# Patient Record
Sex: MALE | NOT HISPANIC OR LATINO | URBAN - METROPOLITAN AREA
[De-identification: names, ages, dates, MRNs, and addresses within clinical notes are randomized per-mention and may not be internally consistent; named-entity substitution may affect disease eponyms.]

---

## 2020-12-03 ENCOUNTER — IMPORTED ENCOUNTER (OUTPATIENT)
Dept: URBAN - METROPOLITAN AREA CLINIC 31 | Facility: CLINIC | Age: 85
End: 2020-12-03

## 2020-12-03 PROBLEM — H25.13: Noted: 2020-12-03

## 2020-12-03 PROCEDURE — 99204 OFFICE O/P NEW MOD 45 MIN: CPT

## 2020-12-03 PROCEDURE — 92025 CPTRIZED CORNEAL TOPOGRAPHY: CPT

## 2020-12-03 PROCEDURE — 92136 OPHTHALMIC BIOMETRY: CPT

## 2020-12-03 NOTE — PATIENT DISCUSSION
Discussed the risks benefits alternatives and limitations of cataract surgery including infection bleeding loss of vision retinal tears detachment. The patient stated a full understanding and a desire to proceed with the procedure in both eyes. Refractive options reviewed. Patient understands IOL calcs are more difficult and that intraoperative ORA measurements will increase the success but does not guarantee not needing a refractive enhancement. Pt desired to schedule surgery OU with ORA guidance. OS/ODnear mature cataracts

## 2020-12-10 ENCOUNTER — IMPORTED ENCOUNTER (OUTPATIENT)
Dept: URBAN - METROPOLITAN AREA CLINIC 31 | Facility: CLINIC | Age: 85
End: 2020-12-10

## 2020-12-10 PROBLEM — H25.813: Noted: 2020-12-10

## 2020-12-10 PROCEDURE — 76519 ECHO EXAM OF EYE: CPT

## 2020-12-10 NOTE — PATIENT DISCUSSION
Cataract OU: Discussed the risks benefits alternatives and limitations of cataract surgery including infection bleeding loss of vision retinal tears detachment. The patient stated a full understanding and a desire to proceed with the procedure in both eyes. Refractive options reviewed. Patient understands IOL calcs are more difficult and that intraoperative ORA measurements will increase the success but does not guarantee not needing a refractive enhancement. Pt desired to schedule surgery OU with ORA guidance.

## 2020-12-10 NOTE — PATIENT DISCUSSION
1.  Discussed the risks benefits alternatives and limitations of cataract surgery including infection bleeding loss of vision retinal tears detachment. The patient stated a full understanding and a desire to proceed with the procedure in both eyes. Refractive options were reviewed. Patient has elected to be optimized for distance vision in both eyes. The patient will still need glasses for reading and to possibly fine tune distance vision. 2. Cataract OU: Discussed the risks benefits alternatives and limitations of cataract surgery including infection bleeding loss of vision retinal tears detachment. The patient stated a full understanding and a desire to proceed with the procedure in both eyes. Refractive options reviewed. Patient understands IOL calcs are more difficult and that intraoperative ORA measurements will increase the success but does not guarantee not needing a refractive enhancement. Pt desired to schedule surgery OU with ORA guidance. Dense cataract  post LASIK. ORA recommended. Need for glasses post op disucssed.

## 2020-12-29 ENCOUNTER — IMPORTED ENCOUNTER (OUTPATIENT)
Dept: URBAN - METROPOLITAN AREA CLINIC 31 | Facility: CLINIC | Age: 85
End: 2020-12-29

## 2020-12-29 PROBLEM — Z96.1: Noted: 2020-12-29

## 2020-12-29 PROCEDURE — 99024 POSTOP FOLLOW-UP VISIT: CPT

## 2020-12-29 NOTE — PATIENT DISCUSSION
Post-Op Day #1 - Cataract Surgery Left Eye (OS) - doing well. Tears prn. S/P POST CAPS TEAR W/O VITREOUS LOSS.3 PIECE IOL WITH HAPTICS IN THE SULCUS OPTIC IN THEBAG  Continue postop drops as directed.    Call office with symptoms of pain redness or decreased vision in operative eye.  1 drop of tobramycin instilled

## 2021-01-05 ENCOUNTER — IMPORTED ENCOUNTER (OUTPATIENT)
Dept: URBAN - METROPOLITAN AREA CLINIC 31 | Facility: CLINIC | Age: 86
End: 2021-01-05

## 2021-01-05 PROBLEM — Z96.1: Noted: 2021-01-05

## 2021-01-05 PROCEDURE — 99024 POSTOP FOLLOW-UP VISIT: CPT

## 2021-01-12 ENCOUNTER — IMPORTED ENCOUNTER (OUTPATIENT)
Dept: URBAN - METROPOLITAN AREA CLINIC 31 | Facility: CLINIC | Age: 86
End: 2021-01-12

## 2021-01-12 PROBLEM — Z96.1: Noted: 2021-01-12

## 2021-01-12 PROBLEM — H35.3132: Noted: 2021-01-12

## 2021-01-12 PROCEDURE — 99024 POSTOP FOLLOW-UP VISIT: CPT

## 2021-01-12 NOTE — PATIENT DISCUSSION
1.  Post-Op Week #1 - Cataract Surgery Right Eye (OD) - Intraocular lens stable and surgery very well healed. Loose stitch removed. 1 drop of Besivance instilled after suture removal. Patient to resume all normal activities. Finish postop drops as directed. Final Refraction given if necessary. Call with any problems. 2. Post-Op Week #2 - Cataract Surgery Left Eye (OS) -  Intraocular lens stable and surgery very well healed. Patient to resume all normal activities. Finish postop drops as directed. Final Refraction given if necessary. 3. ARMD OU dry - Importance of smoking cessation blood pressure control and healthy diet were emphasized. In accordance with the AREDS study a good multivitamin containing EC and Zinc were recommened to be taken daily. Patient was instructed to self monitor their monocular vision (reading/[employer]) at least weekly. Patient should immediately report any new onset of decreased vision or metamorphopsia. 3-4 weeks for RxReturn for an appointment in 3 weeks for post op refraction. with Dr. Gerda Ruano

## 2021-02-02 ENCOUNTER — IMPORTED ENCOUNTER (OUTPATIENT)
Dept: URBAN - METROPOLITAN AREA CLINIC 31 | Facility: CLINIC | Age: 86
End: 2021-02-02

## 2021-02-02 PROBLEM — Z96.1: Noted: 2021-02-02

## 2021-02-02 PROCEDURE — 99024 POSTOP FOLLOW-UP VISIT: CPT

## 2021-02-02 NOTE — PATIENT DISCUSSION
Post-Op Cataract Surgery 15-90 days Both Eyes (OU)-  Doing well with stable vision. Monitor for changesFinal glasses RX given today. Return for an appointment in 4 months for dilated fundus exam. with Dr. Kiran Aranda

## 2022-04-02 ASSESSMENT — VISUAL ACUITY
OD_CC: 20/100
OS_CC: 20/100
OD_CC: 20/30-1
OS_PH: SC 20/80 -2
OS_CC: 20/80+1
OD_CC: 20/40
OS_CC: 20/200
OS_CC: 20/400
OD_CC: 20/50+2
OS_CC: 20/100

## 2022-04-02 ASSESSMENT — TONOMETRY
OS_IOP_MMHG: 18
OS_IOP_MMHG: 20
OS_IOP_MMHG: 20
OD_IOP_MMHG: 25
OD_IOP_MMHG: 16
OS_IOP_MMHG: 16
OD_IOP_MMHG: 17

## 2023-01-17 ENCOUNTER — PREPPED CHART (OUTPATIENT)
Dept: URBAN - METROPOLITAN AREA CLINIC 29 | Facility: CLINIC | Age: 88
End: 2023-01-17

## 2023-01-17 NOTE — PATIENT DISCUSSION
Post-Op Cataract Surgery 15-90 days Both Eyes (OU)-  Doing well with stable vision. Monitor for changesFinal glasses RX given today. Return for an appointment in 4 months for dilated fundus exam. with Dr. Freedom Frances.

## 2023-01-19 ENCOUNTER — ESTABLISHED PATIENT (OUTPATIENT)
Dept: URBAN - METROPOLITAN AREA CLINIC 29 | Facility: CLINIC | Age: 88
End: 2023-01-19

## 2023-01-19 DIAGNOSIS — H35.3132: ICD-10-CM

## 2023-01-19 DIAGNOSIS — Z96.1: ICD-10-CM

## 2023-01-19 DIAGNOSIS — H26.492: ICD-10-CM

## 2023-01-19 PROCEDURE — 92134 CPTRZ OPH DX IMG PST SGM RTA: CPT

## 2023-01-19 PROCEDURE — 92014 COMPRE OPH EXAM EST PT 1/>: CPT

## 2023-01-19 ASSESSMENT — VISUAL ACUITY
OD_SC: 20/100
OS_CC: 20/60
OD_GLARE: 20/200
OS_SC: 20/200
OS_PH: 20/80
OD_CC: 20/60
OD_PH: 20/50-2

## 2023-01-19 ASSESSMENT — TONOMETRY
OS_IOP_MMHG: 15
OD_IOP_MMHG: 15

## 2023-01-30 ENCOUNTER — SURGERY/PROCEDURE (OUTPATIENT)
Dept: URBAN - METROPOLITAN AREA SURGERY 17 | Facility: SURGERY | Age: 88
End: 2023-01-30

## 2023-01-30 DIAGNOSIS — H26.491: ICD-10-CM

## 2023-01-30 PROCEDURE — 66821 AFTER CATARACT LASER SURGERY: CPT
